# Patient Record
Sex: MALE | Race: AMERICAN INDIAN OR ALASKA NATIVE
[De-identification: names, ages, dates, MRNs, and addresses within clinical notes are randomized per-mention and may not be internally consistent; named-entity substitution may affect disease eponyms.]

---

## 2019-11-12 ENCOUNTER — HOSPITAL ENCOUNTER (EMERGENCY)
Dept: HOSPITAL 5 - ED | Age: 34
Discharge: LEFT BEFORE DISCHARGE | End: 2019-11-12
Payer: SELF-PAY

## 2019-11-12 VITALS — SYSTOLIC BLOOD PRESSURE: 162 MMHG | DIASTOLIC BLOOD PRESSURE: 97 MMHG

## 2019-11-12 DIAGNOSIS — R21: Primary | ICD-10-CM

## 2019-11-12 DIAGNOSIS — L01.00: ICD-10-CM

## 2019-11-12 PROCEDURE — 99281 EMR DPT VST MAYX REQ PHY/QHP: CPT

## 2019-11-12 NOTE — EMERGENCY DEPARTMENT REPORT
Chief Complaint: Skin Rash


Stated Complaint: IMPETIGO


Time Seen by Provider: 11/12/19 15:58





- HPI


History of Present Illness: 





34-year-old -American male presents to the emergency room stating he has 

impetigo and is spreading.  Patient reported to me that he did not complete all 

of his Keflex because he was drinking and now has misplaces bottle and comes in 

now wanting a prescription for antibiotics.  Patient denies any fever or chills 

no nausea no vomiting.





- Exam


Vital Signs: 


                                   Vital Signs











  11/12/19 11/12/19





  12:25 15:53


 


Temperature 98.2 F 98.6 F


 


Pulse Rate 71 57 L


 


Respiratory 16 18





Rate  


 


Blood Pressure 147/95 


 


Blood Pressure  162/97





[Left]  


 


O2 Sat by Pulse 100 100





Oximetry  











Physical Exam: 





Patient's alert and oriented 3.  Patient does ambulate without difficulty.  

Speaking without difficulty.


Patient has multiple crusted lesions on hands.


MSE screening note: 


Focused history and physical exam performed.


Due to findings the following was ordered:











ED Disposition for MSE


Clinical Impression: 


 Rash and nonspecific skin eruption





Disposition: Z-07 MED SCREENING EXAM-LEFT


Is pt being admited?: No


Does the pt Need Aspirin: No


Condition: Stable


Instructions:  Impetigo (ED)


Referrals: 


[facility] [Outside] - 3-5 Days


[facility] [Outside] - 3-5 Days


KATE LOWE MD [Staff Physician] - 3-5 Days